# Patient Record
Sex: MALE | Race: WHITE | NOT HISPANIC OR LATINO | URBAN - METROPOLITAN AREA
[De-identification: names, ages, dates, MRNs, and addresses within clinical notes are randomized per-mention and may not be internally consistent; named-entity substitution may affect disease eponyms.]

---

## 2018-11-08 ENCOUNTER — OFFICE (OUTPATIENT)
Dept: URBAN - METROPOLITAN AREA CLINIC 78 | Facility: CLINIC | Age: 38
End: 2018-11-08

## 2018-11-08 VITALS
DIASTOLIC BLOOD PRESSURE: 94 MMHG | TEMPERATURE: 97.8 F | WEIGHT: 224 LBS | SYSTOLIC BLOOD PRESSURE: 117 MMHG | HEIGHT: 67 IN | HEART RATE: 88 BPM

## 2018-11-08 DIAGNOSIS — K57.32 DIVERTICULITIS OF LARGE INTESTINE WITHOUT PERFORATION OR ABS: ICD-10-CM

## 2018-11-08 DIAGNOSIS — G45.9 TRANSIENT CEREBRAL ISCHEMIC ATTACK, UNSPECIFIED: ICD-10-CM

## 2018-11-08 PROCEDURE — 99244 OFF/OP CNSLTJ NEW/EST MOD 40: CPT

## 2018-11-08 NOTE — SERVICEHPINOTES
ANTHONY DENVER   is a   38   year old male who is being seen in consultation at the request of   [facility]   for diverticulitis. Patient had an episode of this in September. He had LLQ pain, constipation, and low grade fever. He went to the ER at [place] and had CT showing diverticulitis at junction of descending and sigmoid colon. He was treated with Augmentin and notes resolution of symptoms. He currently has mostly formed stools. Can have a few BMs on one day and then might skip a day before he has another BM. No blood in stools.He notes that he had gone to the ER ([place]) prior to his diverticulitis episode due to left arm numbness. Was diagnosed with TIA. He will be seeing neurology today. He also just went to the ER again 2 days ago for vomiting and chest pain and was apparently kept overnight for cardiac observation. He will likely need to be seen by cardiology in the near future, though he is not sure yet about an appointment. He drinks alcohol somewhat excessively at times. Has unhealthy dietary/lifestyle habits though reports that he is trying to change his diet. Also no longer smoking. No other concerns today.FONT style="BACKGROUND-COLOR: #ffffcc" visited="true"BR/FONT